# Patient Record
Sex: MALE | Race: WHITE | NOT HISPANIC OR LATINO | Employment: FULL TIME | URBAN - METROPOLITAN AREA
[De-identification: names, ages, dates, MRNs, and addresses within clinical notes are randomized per-mention and may not be internally consistent; named-entity substitution may affect disease eponyms.]

---
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
## 2020-06-25 ENCOUNTER — TELEMEDICINE (OUTPATIENT)
Dept: FAMILY MEDICINE CLINIC | Facility: TELEHEALTH | Age: 20
End: 2020-06-25

## 2020-06-25 DIAGNOSIS — U07.1 COVID-19 VIRUS DETECTED: Primary | ICD-10-CM

## 2020-06-25 DIAGNOSIS — Z20.822 CLOSE EXPOSURE TO COVID-19 VIRUS: ICD-10-CM

## 2020-06-25 PROCEDURE — 99213 OFFICE O/P EST LOW 20 MIN: CPT | Performed by: PHYSICIAN ASSISTANT

## 2020-06-25 NOTE — PATIENT INSTRUCTIONS
Encourage fluids.  Advise symptomatic treatment; antihistamines such as allegra or claritin, and decongestants such as sudafed.  May take tylenol if fever develops.  If symptoms worsen; severe shortness of breath develops, mental status changes develop, or if you are unable to perform your normal activities of daily living; go to the ER.  Quarantine x 10 days since date of COVID test. Wear a mask for 14 days after COVID test date.    How to Quarantine at Home  Information for Patients and Families    These instructions are for people with confirmed or suspected COVID-19 who do not need to be hospitalized and those with confirmed COVID-19 who were hospitalized and discharged to care for themselves at home.    If you were tested through the Health Department  The Health Department will monitor your wellbeing.  If it is determined that you do not need to be hospitalized and can be isolated at home, you will be monitored by staff from your local or state health department.     If you were tested through a Commercial Lab  You will need to monitor yourself and report changes in your symptoms to your doctor.  See the section below called Monitor Your Symptoms.    Follow these steps until a healthcare provider or local or state health department says you can return to your normal activities.    Stay home except to get medical care  • Restrict activities outside your home, except for getting medical care.   • Do not go to work, school, or public areas.   • Avoid using public transportation, ride-sharing, or taxis.    Separate yourself from other people and animals in your home  People  As much as possible, you should stay in a specific room and away from other people in your home. Also, you should use a separate bathroom, if available.    Animals  You should restrict contact with pets and other animals while you are sick with COVID-19, just like you would around other people. When possible, have another member of your  household care for your animals while you are sick. If you are sick with COVID-19, avoid contact with your pet, including petting, snuggling, being kissed or licked, and sharing food. If you must care for your pet or be around animals while you are sick, wash your hands before and after you interact with pets and wear a facemask. See COVID-19 and Animals for more information.    Call ahead before visiting your doctor  If you have a medical appointment, call the healthcare provider and tell them that you have or may have COVID-19. This information will help the healthcare provider’s office take steps to keep other people from getting infected or exposed.    Wear a facemask  You should wear a facemask when you are around other people (e.g., sharing a room or vehicle) or pets and before you enter a healthcare provider’s office.     If you are not able to wear a facemask (for example, because it causes trouble breathing), then people who live with you should not stay in the same room with you, or they should wear a facemask if they enter your room.    Cover your coughs and sneezes  • Cover your mouth and nose with a tissue when you cough or sneeze.   • Throw used tissues in a lined trash can.   • Immediately wash your hands with soap and water for at least 20 seconds or, if soap and water are not available, clean your hands with an alcohol-based hand  that contains at least 60% alcohol.    Clean your hands often  • Wash your hands often with soap and water for at least 20 seconds, especially after blowing your nose, coughing, or sneezing; going to the bathroom; and before eating or preparing food.     • If soap and water are not readily available, use an alcohol-based hand  with at least 60% alcohol, covering all surfaces of your hands and rubbing them together until they feel dry.    • Soap and water are the best option if hands are visibly dirty. Avoid touching your eyes, nose, and mouth with  unwashed hands.    Avoid sharing personal household items  • You should not share dishes, drinking glasses, cups, eating utensils, towels, or bedding with other people or pets in your home.   • After using these items, they should be washed thoroughly with soap and water.    Clean all “high-touch” surfaces everyday  • High touch surfaces include counters, tabletops, doorknobs, bathroom fixtures, toilets, phones, keyboards, tablets, and bedside tables.   • Also, clean any surfaces that may have blood, stool, or body fluids on them.   • Use a household cleaning spray or wipe, according to the label instructions. Labels contain instructions for safe and effective use of the cleaning product, including precautions you should take when applying the product, such as wearing gloves and making sure you have good ventilation during use of the product.    Monitor your symptoms  • Seek prompt medical attention if your illness is worsening (e.g., difficulty breathing).   • Before seeking care, call your healthcare provider and tell them that you have, or are being evaluated for, COVID-19.   • Put on a facemask before you enter the facility.     • These steps will help the healthcare provider’s office to keep other people in the office or waiting room from getting infected or exposed.   • Persons who are placed under active monitoring or facilitated self-monitoring should follow instructions provided by their local health department or occupational health professionals, as appropriate.  • If you have a medical emergency and need to call 911, notify the dispatch personnel that you have, or are being evaluated for COVID-19. If possible, put on a facemask before emergency medical services arrive.    Discontinuing home isolation  Patients with confirmed COVID-19 should remain under home isolation precautions until the risk of secondary transmission to others is thought to be low. The decision to discontinue home isolation  precautions should be made on a case-by-case basis, in consultation with healthcare providers and state and local health departments.    The below content are for household members, intimate partners, and caregivers of a patient with symptomatic laboratory-confirmed COVID-19 or a patient under investigation:    Household members, intimate partners, and caregivers may have close contact with a person with symptomatic, laboratory-confirmed COVID-19 or a person under investigation.     Close contacts should monitor their health; they should call their healthcare provider right away if they develop symptoms suggestive of COVID-19 (e.g., fever, cough, shortness of breath)     Close contacts should also follow these recommendations:  • Make sure that you understand and can help the patient follow their healthcare provider’s instructions for medication(s) and care. You should help the patient with basic needs in the home and provide support for getting groceries, prescriptions, and other personal needs.  • Monitor the patient’s symptoms. If the patient is getting sicker, call his or her healthcare provider and tell them that the patient has laboratory-confirmed COVID-19. This will help the healthcare provider’s office take steps to keep other people in the office or waiting room from getting infected. Ask the healthcare provider to call the local or state health department for additional guidance. If the patient has a medical emergency and you need to call 911, notify the dispatch personnel that the patient has, or is being evaluated for COVID-19.  • Household members should stay in another room or be  from the patient as much as possible. Household members should use a separate bedroom and bathroom, if available.  • Prohibit visitors who do not have an essential need to be in the home.  • Household members should care for any pets in the home. Do not handle pets or other animals while sick.  For more information,  see COVID-19 and Animals.  • Make sure that shared spaces in the home have good air flow, such as by an air conditioner or an opened window, weather permitting.  • Perform hand hygiene frequently. Wash your hands often with soap and water for at least 20 seconds or use an alcohol-based hand  that contains 60 to 95% alcohol, covering all surfaces of your hands and rubbing them together until they feel dry. Soap and water should be used preferentially if hands are visibly dirty.  • Avoid touching your eyes, nose, and mouth with unwashed hands.  • The patient should wear a facemask when you are around other people. If the patient is not able to wear a facemask (for example, because it causes trouble breathing), you, as the caregiver, should wear a mask when you are in the same room as the patient.  • Wear a disposable facemask and gloves when you touch or have contact with the patient’s blood, stool, or body fluids, such as saliva, sputum, nasal mucus, vomit, or urine.   o Throw out disposable facemasks and gloves after using them. Do not reuse.  o When removing personal protective equipment, first remove and dispose of gloves. Then, immediately clean your hands with soap and water or alcohol-based hand . Next, remove and dispose of facemask, and immediately clean your hands again with soap and water or alcohol-based hand .  • Avoid sharing household items with the patient. You should not share dishes, drinking glasses, cups, eating utensils, towels, bedding, or other items. After the patient uses these items, you should wash them thoroughly (see below “Wash laundry thoroughly”).  • Clean all “high-touch” surfaces, such as counters, tabletops, doorknobs, bathroom fixtures, toilets, phones, keyboards, tablets, and bedside tables, every day. Also, clean any surfaces that may have blood, stool, or body fluids on them.   o Use a household cleaning spray or wipe, according to the label  instructions. Labels contain instructions for safe and effective use of the cleaning product including precautions you should take when applying the product, such as wearing gloves and making sure you have good ventilation during use of the product.  • Wash laundry thoroughly.   o Immediately remove and wash clothes or bedding that have blood, stool, or body fluids on them.  o Wear disposable gloves while handling soiled items and keep soiled items away from your body. Clean your hands (with soap and water or an alcohol-based hand ) immediately after removing your gloves.  o Read and follow directions on labels of laundry or clothing items and detergent. In general, using a normal laundry detergent according to washing machine instructions and dry thoroughly using the warmest temperatures recommended on the clothing label.  • Place all used disposable gloves, facemasks, and other contaminated items in a lined container before disposing of them with other household waste. Clean your hands (with soap and water or an alcohol-based hand ) immediately after handling these items. Soap and water should be used preferentially if hands are visibly dirty.  • Discuss any additional questions with your state or local health department or healthcare provider.    Adapted from information provided by the Centers for Disease Control and Prevention.  For more information, visit https://www.cdc.gov/coronavirus/2019-ncov/hcp/guidance-prevent-spread.html

## 2020-06-25 NOTE — PROGRESS NOTES
Terence Dumont    2000  [id number]    I have reviewed the e-Visit questionnaire and patient's answers via E video visit.  My assessment and plan are as follows:    CC: Positive COVID test questions    HISTORY OF PRESENT ILLNESS  HPI   Pt is a 19 yr old m who presents via E visit with c/o positive COVID test results today and questions.  Pt states was in [place] last week and one of the people he traveled with tested positive for COVID.  Pt had test 2 days ago.  Pt c/o associated nasal congestion today and sore throat and cough last week that resolved.  Denies fever, shortness of breath, difficulty breathing, loss of taste/smell, nausea, vomiting, and diarrhea.  Pt states had positive COVID test at [facility].  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    Review of Systems   Constitutional: Negative for fever.   HENT: Positive for congestion and sore throat (last week; resolved). Negative for rhinorrhea.    Respiratory: Positive for cough (last week; resolved). Negative for shortness of breath and wheezing.           Physical Exam   Constitutional: He is oriented to person, place, and time. He appears well-developed and well-nourished.   HENT:   Head: Normocephalic and atraumatic.   Pulmonary/Chest: Effort normal.   Neurological: He is alert and oriented to person, place, and time.   Psychiatric: He has a normal mood and affect. His behavior is normal. Judgment and thought content normal.          Diagnoses and all orders for this visit:    COVID-19 virus detected    Close exposure to COVID-19 virus        Any medications prescribed have been sent electronically to   Encourage fluids.  Advise symptomatic treatment; antihistamines such as allegra or claritin, and decongestants such as sudafed.  May take tylenol if fever develops.  If symptoms worsen; severe shortness of breath develops, mental status changes develop, or if you are unable to perform your normal activities of daily living; go to the ER.   Quarantine x 10 days since date of COVID test. Wear a mask for 14 days after COVID test date.    Evideo visit 20 mins    Miriam Hodge PA-C  06/25/20  4:08 PM

## 2021-01-12 ENCOUNTER — LAB (OUTPATIENT)
Dept: LAB | Facility: HOSPITAL | Age: 21
End: 2021-01-12

## 2021-01-12 ENCOUNTER — TRANSCRIBE ORDERS (OUTPATIENT)
Dept: LAB | Facility: HOSPITAL | Age: 21
End: 2021-01-12

## 2021-01-12 DIAGNOSIS — Z79.899 ENCOUNTER FOR LONG-TERM (CURRENT) USE OF OTHER MEDICATIONS: ICD-10-CM

## 2021-01-12 DIAGNOSIS — Z79.899 ENCOUNTER FOR LONG-TERM (CURRENT) USE OF OTHER MEDICATIONS: Primary | ICD-10-CM

## 2021-01-12 LAB
ANION GAP SERPL CALCULATED.3IONS-SCNC: 7.6 MMOL/L (ref 5–15)
BUN SERPL-MCNC: 11 MG/DL (ref 6–20)
BUN/CREAT SERPL: 11.8 (ref 7–25)
CALCIUM SPEC-SCNC: 9.7 MG/DL (ref 8.6–10.5)
CHLORIDE SERPL-SCNC: 103 MMOL/L (ref 98–107)
CO2 SERPL-SCNC: 29.4 MMOL/L (ref 22–29)
CREAT SERPL-MCNC: 0.93 MG/DL (ref 0.76–1.27)
GFR SERPL CREATININE-BSD FRML MDRD: 104 ML/MIN/1.73
GLUCOSE SERPL-MCNC: 93 MG/DL (ref 65–99)
LITHIUM SERPL-SCNC: 0.9 MMOL/L (ref 0.6–1.2)
POTASSIUM SERPL-SCNC: 4.7 MMOL/L (ref 3.5–5.2)
SODIUM SERPL-SCNC: 140 MMOL/L (ref 136–145)
TSH SERPL DL<=0.05 MIU/L-ACNC: 8.04 UIU/ML (ref 0.27–4.2)

## 2021-01-12 PROCEDURE — 84443 ASSAY THYROID STIM HORMONE: CPT

## 2021-01-12 PROCEDURE — 36415 COLL VENOUS BLD VENIPUNCTURE: CPT

## 2021-01-12 PROCEDURE — 80178 ASSAY OF LITHIUM: CPT

## 2021-01-12 PROCEDURE — 80048 BASIC METABOLIC PNL TOTAL CA: CPT

## 2021-08-06 ENCOUNTER — OFFICE VISIT (OUTPATIENT)
Dept: SPORTS MEDICINE | Facility: CLINIC | Age: 21
End: 2021-08-06

## 2021-08-06 VITALS
BODY MASS INDEX: 22.4 KG/M2 | SYSTOLIC BLOOD PRESSURE: 120 MMHG | WEIGHT: 160 LBS | TEMPERATURE: 98 F | DIASTOLIC BLOOD PRESSURE: 60 MMHG | OXYGEN SATURATION: 98 % | HEART RATE: 74 BPM | HEIGHT: 71 IN | RESPIRATION RATE: 16 BRPM

## 2021-08-06 DIAGNOSIS — M84.362A STRESS FRACTURE, LEFT TIBIA, INITIAL ENCOUNTER FOR FRACTURE: ICD-10-CM

## 2021-08-06 DIAGNOSIS — M79.669 PAIN IN THE SHINS, UNSPECIFIED LATERALITY: Primary | ICD-10-CM

## 2021-08-06 DIAGNOSIS — S86.899A MEDIAL TIBIAL STRESS SYNDROME, UNSPECIFIED LATERALITY, INITIAL ENCOUNTER: ICD-10-CM

## 2021-08-06 PROCEDURE — 99204 OFFICE O/P NEW MOD 45 MIN: CPT | Performed by: FAMILY MEDICINE

## 2021-08-06 PROCEDURE — 73590 X-RAY EXAM OF LOWER LEG: CPT | Performed by: FAMILY MEDICINE

## 2021-08-06 RX ORDER — LITHIUM CARBONATE 450 MG
450 TABLET, EXTENDED RELEASE ORAL 2 TIMES DAILY
COMMUNITY
Start: 2021-07-14 | End: 2022-07-29

## 2021-08-06 RX ORDER — LAMOTRIGINE 25 MG/1
50 TABLET ORAL DAILY
COMMUNITY
Start: 2021-07-14 | End: 2022-07-29

## 2021-08-06 NOTE — PROGRESS NOTES
"Terence is a 21 y.o. year old male presents to [facility]    Chief Complaint   Patient presents with   • Leg Pain     evaluation for B/L leg/shin pain - NKI, patient is a runner, worsened about 3 weeks ago - here today with new  x-rays for further evaluation and treatment        History of Present Illness  Recently, over the past 2 years approximately he began running for enjoyment.  He has been trying to run recently but he has been limited due to bilateral lower leg pain.  He states that the most he will run is 3 miles in one setting.  He attempted running early June but had to get different footwear due to his pain.  He states that he has taken time off though his pain still persists even at rest in the left lower extremity.  Has tried ice which has been helpful.  Returning to college next week.  [facility].    I have reviewed the patient's medical, family, and social history in detail and updated the computerized patient record.    /60 (BP Location: Left arm, Patient Position: Sitting, Cuff Size: Adult)   Pulse 74   Temp 98 °F (36.7 °C) (Temporal)   Resp 16   Ht 180.3 cm (71\")   Wt 72.6 kg (160 lb)   SpO2 98%   BMI 22.32 kg/m²      Physical Exam    Mask worn thru encounter  Vital signs reviewed.   General: No acute distress.  Eyes: conjunctiva clear; pupils equally round and reactive  ENT: external ears atraumatic  CV: no peripheral edema  Resp: normal respiratory effort, no use of accessory muscles  Skin: no rashes or wounds; normal turgor  Psych: mood and affect appropriate; recent and remote memory intact  Neuro: sensation to light touch intact    MSK Exam  Bilateral lower extremity: No gross abnormality.  There is focal area of tenderness along the distal third of the medial tibia that is worsened with single leg hop.     Bilateral Tib-Fib X-Ray  Indication: Pain  AP and Lateral views    Findings:  No fracture  No bony lesion  Normal soft tissues  Normal " joint spaces    No prior studies were available for comparison.           Diagnoses and all orders for this visit:    Pain in the shins, unspecified laterality  -     XR Tibia Fibula 2 View Bilateral    Medial tibial stress syndrome, unspecified laterality, initial encounter  -     Ambulatory Referral to Physical Therapy    Stress fracture, left tibia, initial encounter for fracture  -     MRI Tibia Fibula Left Without Contrast; Future    Other orders  -     lithium (ESKALITH) 450 MG CR tablet; Take 450 mg by mouth 2 (Two) Times a Day.  -     lamoTRIgine (LaMICtal) 25 MG tablet; Take 50 mg by mouth Daily.    I do think gait analysis, PT will be of help though given his focal bone pain despite adequate interval of rest, I am concerned for stress fracture of the left tibia.  I recommend MRI on this leg prior to pursuing physical therapy.      Follow Up   No follow-ups on file.  Patient was given instructions and counseling regarding his condition or for health maintenance advice. Please see specific information pulled into the AVS if appropriate.     EMR Dragon/Transcription disclaimer:    Much of this encounter note is an electronic transcription/translation of spoken language to printed text.  The electronic translation of spoken language may permit erroneous, or at times, nonsensical words or phrases to be inadvertently transcribed.  Although I have reviewed the note for such errors some may still exist.

## 2021-08-07 ENCOUNTER — HOSPITAL ENCOUNTER (OUTPATIENT)
Dept: MRI IMAGING | Facility: HOSPITAL | Age: 21
Discharge: HOME | End: 2021-08-07
Admitting: FAMILY MEDICINE

## 2021-08-07 DIAGNOSIS — M84.362A STRESS FRACTURE, LEFT TIBIA, INITIAL ENCOUNTER FOR FRACTURE: ICD-10-CM

## 2021-08-07 PROCEDURE — 73718 MRI LOWER EXTREMITY W/O DYE: CPT

## 2022-07-29 PROBLEM — Z20.822 ENCOUNTER FOR LABORATORY TESTING FOR COVID-19 VIRUS: Status: ACTIVE | Noted: 2022-07-29

## 2022-07-29 PROCEDURE — U0004 COV-19 TEST NON-CDC HGH THRU: HCPCS | Performed by: NURSE PRACTITIONER

## 2024-05-21 ENCOUNTER — TRANSCRIBE ORDERS (OUTPATIENT)
Dept: PSYCHIATRY | Facility: CLINIC | Age: 24
End: 2024-05-21
Payer: COMMERCIAL

## 2024-05-21 DIAGNOSIS — F32.A DEPRESSION, UNSPECIFIED DEPRESSION TYPE: Primary | ICD-10-CM

## 2024-05-21 DIAGNOSIS — F31.9 BIPOLAR AFFECTIVE DISORDER, REMISSION STATUS UNSPECIFIED: ICD-10-CM

## 2024-05-24 ENCOUNTER — OFFICE VISIT (OUTPATIENT)
Dept: PSYCHIATRY | Facility: CLINIC | Age: 24
End: 2024-05-24
Payer: COMMERCIAL

## 2024-05-24 DIAGNOSIS — F41.1 GENERALIZED ANXIETY DISORDER: ICD-10-CM

## 2024-05-24 DIAGNOSIS — F33.1 MAJOR DEPRESSIVE DISORDER, RECURRENT EPISODE, MODERATE: Primary | ICD-10-CM

## 2024-05-24 RX ORDER — BUPROPION HYDROCHLORIDE 100 MG/1
100 TABLET, EXTENDED RELEASE ORAL DAILY
Qty: 7 TABLET | Refills: 0 | Status: SHIPPED | OUTPATIENT
Start: 2024-05-24

## 2024-05-24 RX ORDER — BUPROPION HYDROCHLORIDE 150 MG/1
150 TABLET ORAL EVERY MORNING
Qty: 30 TABLET | Refills: 1 | Status: SHIPPED | OUTPATIENT
Start: 2024-05-24 | End: 2025-05-24

## 2024-05-24 NOTE — PROGRESS NOTES
"  Chief complaint: medication management       Subjective      History of present illness:  Dr. Larsen referred pt for medication management for bipolar do and anxiety.  Started medication in December 2023 Vraylar, gabapentin, and effexor   Previously seen with [facility] for medication management   Been on and off meds since 2019   Hx of non-compliance , often stops taking because he forgets medication   Started college in 2019 Depression , and anxiety began at that time related to difficulty of work and classes.   2021 SA by OD on Advil, started on Lithium while hospitalized   Stopped bc lab testing was difficult     Denies SI today   Hx of Passive SI and one previous attempt   When feeling overwhelmed, begins to isolate, thinks about ways to end life, such as OD, but denies intent.   No access to guns or weapons   Lives alone, Parents and girlfriend aware of pts struggles with mental health  Safe outlet for pt, can request help if needed   Has not had thoughts or SI recently       Discussed bipolar diagnosis   \"Manic symptoms\" associated with increased energy, \"very hyper\"  Depression associated with \"low low days\" decreased energy, decreased motivation, isolating     \"Manic episodes\" last approximately \"few days\" , occurring approximately 1x per month   Depressive episode follows, usually depressed more    20 years of age completed neuro psych testing : was not evaluated for ADHD   Pt had a hard time completing testing due to inattention, restlessness  Evaluator did not feel ADHD testing was appropriate bc \"I had made it this far into my life and done ok with school\"      Tantrums as a child, often left school due to behavior  Easily overwhelmed     Currently in school, has difficulty in school currently   Non-preferred tasks are difficult   Trouble doing homework through out his life, grades were always \"ok\" but often procrastinates to the last minute until he has to complete work   Causing " "frustration and anxiety, feeling inadequate   \"Then I get really depressed and anxious\"   Trouble staying on tasks , wants to read but can't stay focused   Comprehension decreased , often rereads, wishes he could read more   Head phones help, white noise helps   Completing work at another location then home helps      \"The bipolar diagnosis has never resonated with me, I feel like they just decided that when I was in the hospital\"       Mood: \"Its pretty alright\"     Sleep: \"Pretty bad, I tend to sleep in\" Goes to bed 2300-00 : usually wakes early at 0600 but goes back to sleep until 7955-6469.     Energy: \"Pretty low, I think oversleeping has to do with that\"     Concentration/Focus: \"Its not as great as it could be\" Difficult to concentrate on tasks at work, often gets distracted.     Appetite: Gained 15 pounds this last year without meaning to , poor diet eats fast food often     SI/HI/AVH: Denies     Denies current self injurious behavior  Denies current symptoms of psychosis, veronica, hypomania  Denies current symptoms of panic  Denies current SI/HI/AVH   Denies any current unwanted or adverse medication side effects     Medical History     PCP: Dr. Larsen   [facility] Denies     Denies history of or current seizures, denies history of head injury  Denies cardiac history, or abnormal ekgs, or irregular heart rate/rhythm    Psychiatric History    Previous Diagnoses: Bipolar, depression, anxiety   Previous Psychotropic medications: Lithium, Vraylar, Effexor, gabapentin, Lexapro, Hydroxyzine   Psychotherapy: Previous, helpful unable to find new therapist with insurance   Hospitalization hx: Previous 2020   Previous SI/HI/AVH: Denies , Previous SI/SA     Family Psychiatric History: Unknown       Social History     Socioeconomic History    Marital status: Single   Tobacco Use    Smoking status: Never     Passive exposure: Never    Smokeless tobacco: Never   Vaping Use    Vaping status: Never Used   Substance and Sexual " Activity    Alcohol use: Yes     Alcohol/week: 3.0 standard drinks of alcohol     Types: 3 Standard drinks or equivalent per week    Drug use: Yes     Types: Marijuana     Comment: OCCAS.    Sexual activity: Yes     Partners: Female     Comment: 3 in last year       Relationships: girlfriend   Education: College   Developmenal HX (504, IEP, milestones, complications at birth): Denies   Occupation: full time   Living Arrangements: alone   Trauma (emotional, psychological, physical, sexual) : No hx of trauma   Legal: Denies   Hobbies: watching soccer, Legos, reading (hard to do) , hiking     Substance use:   Alcohol: 2 beers per week   Illicit drugs: Denies  Cannabis/Marijuana: Delta 8 : once per month   Caffeine: 3 12 oz cans of soda every day   Prescription medications: Denies   Tobacco: Denies   Vaping: Delta 8 : once per month , nicotine (daily)  OTC: Denies         Current Outpatient Medications:       Current Outpatient Medications:     buPROPion SR (Wellbutrin SR) 100 MG 12 hr tablet, Take 1 tablet by mouth Daily., Disp: 7 tablet, Rfl: 0    buPROPion XL (Wellbutrin XL) 150 MG 24 hr tablet, Take 1 tablet by mouth Every Morning., Disp: 30 tablet, Rfl: 1     Allergies:  Allergies   Allergen Reactions    Amoxicillin Hives    Beef-Derived Products Anaphylaxis     States he is able to eat these regularly now.     Penicillins Hives and Rash    Pork-Derived Products Anaphylaxis     States he is able to eat these regularly now.         PHQ9    PHQ-9 Depression Screening  Little interest or pleasure in doing things? 1-->several days   Feeling down, depressed, or hopeless? 2-->more than half the days   Trouble falling or staying asleep, or sleeping too much? 3-->nearly every day   Feeling tired or having little energy? 3-->nearly every day   Poor appetite or overeating? 0-->not at all   Feeling bad about yourself - or that you are a failure or have let yourself or your family down? 2-->more than half the days   Trouble  concentrating on things, such as reading the newspaper or watching television? 1-->several days   Moving or speaking so slowly that other people could have noticed? Or the opposite - being so fidgety or restless that you have been moving around a lot more than usual? 0-->not at all   Thoughts that you would be better off dead, or of hurting yourself in some way? 1-->several days   PHQ-9 Total Score 13   If you checked off any problems, how difficult have these problems made it for you to do your work, take care of things at home, or get along with other people? very difficult      JONATHAN-7:   Over the last two weeks, how often have you been bothered by the following problems?  Feeling nervous, anxious or on edge: Nearly every day  Not being able to stop or control worrying: Several days  Worrying too much about different things: Nearly every day  Trouble Relaxing: Several days  Being so restless that it is hard to sit still: Not at all  Becoming easily annoyed or irritable: Nearly every day  Feeling afraid as if something awful might happen: Several days  JONATHAN 7 Total Score: 12  If you checked any problems, how difficult have these problems made it for you to do your work, take care of things at home, or get along with other people: Very difficult]    Objective:     Vital Signs   There were no vitals filed for this visit.       MENTAL STATUS EXAM   General Appearance:  Cleanly groomed and dressed  Eye Contact:  Good eye contact  Attitude:  Cooperative and polite  Motor Activity:  Fidgety  Muscle Strength:  Normal  Speech:  Normal rate, tone, volume  Language:  Spontaneous  Mood and affect:  Normal, pleasant  Hopelessness:  Denies  Thought Process:  Logical, goal-directed and linear  Associations/ Thought Content:  No delusions  Hallucinations:  None  Suicidal Ideations:  Not present  Homicidal Ideation:  Not present  Sensorium:  Alert and clear  Orientation:  Person, place, situation and time  Attention Span/  Concentration:  Other  Other Comment:  Fair  Fund of Knowledge:  Appropriate for age and educational level  Intellectual Functioning:  Average range  Insight:  Good  Judgement:  Good  Reliability:  Good  Impulse Control:  Good            Assessment & Plan   Diagnoses and all orders for this visit:    Diagnoses and all orders for this visit:    1. Major depressive disorder, recurrent episode, moderate (Primary)  -     buPROPion SR (Wellbutrin SR) 100 MG 12 hr tablet; Take 1 tablet by mouth Daily.  Dispense: 7 tablet; Refill: 0  -     buPROPion XL (Wellbutrin XL) 150 MG 24 hr tablet; Take 1 tablet by mouth Every Morning.  Dispense: 30 tablet; Refill: 1    2. Generalized anxiety disorder  -     buPROPion SR (Wellbutrin SR) 100 MG 12 hr tablet; Take 1 tablet by mouth Daily.  Dispense: 7 tablet; Refill: 0  -     buPROPion XL (Wellbutrin XL) 150 MG 24 hr tablet; Take 1 tablet by mouth Every Morning.  Dispense: 30 tablet; Refill: 1       Treatment Plan:  Continue supportive psychotherapy efforts and medications as indicated. Treatment and medication options discussed during today's visit. Patient ackowledged and verbally consented to continue with current treatment plan and was educated on the importance of compliance with treatment and follow-up appointments.     Medication side effects reviewed and discussed.  Patient agrees to call office with worsening symptoms or adverse medication side effects.   Continue supportive psychotherapy efforts and medications as indicated. Treatment and medication options discussed during today's visit. Patient ackowledged and verbally consented to continue with current treatment plan and was educated on the importance of compliance with treatment and follow-up appointments.     Pt does not appear to meet full criteria for Bipolar disorder, but will consider as deferential diagnosis.   Manic symptoms included increased energy, hyperactivity.  Denied elevated mood, sleep disruption, or  behavioral changes   Does meet criteria for depression and anxiety   Moderate to severe depression   WURS   ASRS  PHQ9 13   GAD7  12  Evaluated for ADHD, pt appears to have several symptoms consistent with ADHD diagnosis  Wellbutrin can be safer option for bipolar and also helpful for ADHD, depression, and anxiety     Start Wellbutrin 100 daily for 7 days   Increase to Wellbutrin  mg daily for depression, anxiety     Follow up 6-8 weeks     Short Term Goals: Improve depression. Decrease anxiety     Long Term Goals: Remission of symptoms     Treatment Plan discussed with: Patient, I discussed the patients findings and my recommendations with patient. Pt is agreeable and approves of plan.    Pt agrees with a safety plan for any thoughts of self injurious behavior. Pt will call this office at [phone] or the suicide hotline at 192.  In an emergency the pt agrees to call 911.    Referring MD has access to consult report and progress notes in EMR     Babita Corona DNP, APRN   05/24/2024   09:07 EDT

## 2024-07-02 ENCOUNTER — OFFICE VISIT (OUTPATIENT)
Dept: PSYCHIATRY | Facility: CLINIC | Age: 24
End: 2024-07-02
Payer: COMMERCIAL

## 2024-07-02 DIAGNOSIS — F90.0 ADHD (ATTENTION DEFICIT HYPERACTIVITY DISORDER), INATTENTIVE TYPE: ICD-10-CM

## 2024-07-02 DIAGNOSIS — F41.1 GENERALIZED ANXIETY DISORDER: ICD-10-CM

## 2024-07-02 DIAGNOSIS — F33.1 MAJOR DEPRESSIVE DISORDER, RECURRENT EPISODE, MODERATE: Primary | ICD-10-CM

## 2024-07-02 PROCEDURE — 99214 OFFICE O/P EST MOD 30 MIN: CPT

## 2024-07-02 NOTE — PROGRESS NOTES
"  Chief complaint: medication management       Subjective      History of present illness:  Dr. Larsen referred pt for medication management for bipolar do and anxiety.  Started medication in December 2023 Vraylar, gabapentin, and effexor   Previously seen with [facility] for medication management   Been on and off meds since 2019   Hx of non-compliance , often stops taking because he forgets medication   Started college in 2019 Depression , and anxiety began at that time related to difficulty of work and classes.   2021 SA by OD on Advil, started on Lithium while hospitalized   Stopped bc lab testing was difficult     Denies SI today   Hx of Passive SI and one previous attempt   When feeling overwhelmed, begins to isolate, thinks about ways to end life, such as OD, but denies intent.   No access to guns or weapons   Lives alone, Parents and girlfriend aware of pts struggles with mental health  Safe outlet for pt, can request help if needed   Has not had thoughts or SI recently       Discussed bipolar diagnosis   \"Manic symptoms\" associated with increased energy, \"very hyper\"  Depression associated with \"low low days\" decreased energy, decreased motivation, isolating     \"Manic episodes\" last approximately \"few days\" , occurring approximately 1x per month   Depressive episode follows, usually depressed more    20 years of age completed neuro psych testing : was not evaluated for ADHD   Pt had a hard time completing testing due to inattention, restlessness  Evaluator did not feel ADHD testing was appropriate bc \"I had made it this far into my life and done ok with school\"      Tantrums as a child, often left school due to behavior  Easily overwhelmed     Currently in school, has difficulty in school currently   Non-preferred tasks are difficult   Trouble doing homework through out his life, grades were always \"ok\" but often procrastinates to the last minute until he has to complete work   Causing " "frustration and anxiety, feeling inadequate   \"Then I get really depressed and anxious\"   Trouble staying on tasks , wants to read but can't stay focused   Comprehension decreased , often rereads, wishes he could read more   Head phones help, white noise helps   Completing work at another location then home helps      \"The bipolar diagnosis has never resonated with me, I feel like they just decided that when I was in the hospital\"       Today   Hasn't started Wellbutrin  mg , took all of 100 mg   Apartment has pest control issues   Energy increased slightly with Wellbutrin   Sleep improved, waking on a time, not sleeping late   Decrease in anxiety , less irritability   Mood improved despite stressors     Mood: \"Good\"     Sleep: Waking up on time, not sleeping late      Energy: \"A little better\"    Concentration/Focus: Difficult to concentrate on tasks at work, often gets distracted.     Appetite: Gained 15 pounds this last year without meaning to , poor diet eats fast food often     SI/HI/AVH: Denies     Denies current self injurious behavior  Denies current symptoms of psychosis, veronica, hypomania  Denies current symptoms of panic  Denies current SI/HI/AVH   Denies any current unwanted or adverse medication side effects     Medical History     PCP: Dr. Larsen   [facility] Denies     Denies history of or current seizures, denies history of head injury  Denies cardiac history, or abnormal ekgs, or irregular heart rate/rhythm    Psychiatric History    Previous Diagnoses: Bipolar, depression, anxiety   Previous Psychotropic medications: Lithium, Vraylar, Effexor, gabapentin, Lexapro, Hydroxyzine   Psychotherapy: Previous, helpful unable to find new therapist with insurance   Hospitalization hx: Previous 2020   Previous SI/HI/AVH: Denies , Previous SI/SA     Family Psychiatric History: Unknown       Social History     Socioeconomic History    Marital status: Single   Tobacco Use    Smoking status: Never     Passive " exposure: Never    Smokeless tobacco: Never   Vaping Use    Vaping status: Never Used   Substance and Sexual Activity    Alcohol use: Yes     Alcohol/week: 3.0 standard drinks of alcohol     Types: 3 Standard drinks or equivalent per week    Drug use: Yes     Types: Marijuana     Comment: OCCAS.    Sexual activity: Yes     Partners: Female     Comment: 3 in last year       Relationships: girlfriend   Education: College   Developmenal HX (504, IEP, milestones, complications at birth): Denies   Occupation: full time   Living Arrangements: alone   Trauma (emotional, psychological, physical, sexual) : No hx of trauma   Legal: Denies   Hobbies: watching soccer, Legos, reading (hard to do) , hiking     Substance use:   Alcohol: 2 beers per week   Illicit drugs: Denies  Cannabis/Marijuana: Delta 8 : once per month   Caffeine: 3 12 oz cans of soda every day   Prescription medications: Denies   Tobacco: Denies   Vaping: Delta 8 : once per month , nicotine (daily)  OTC: Denies         Current Outpatient Medications:       Current Outpatient Medications:     buPROPion SR (Wellbutrin SR) 100 MG 12 hr tablet, Take 1 tablet by mouth Daily., Disp: 7 tablet, Rfl: 0    buPROPion XL (Wellbutrin XL) 150 MG 24 hr tablet, Take 1 tablet by mouth Every Morning., Disp: 30 tablet, Rfl: 1     Allergies:  Allergies   Allergen Reactions    Amoxicillin Hives    Beef-Derived Products Anaphylaxis     States he is able to eat these regularly now.     Penicillins Hives and Rash    Pork-Derived Products Anaphylaxis     States he is able to eat these regularly now.         PHQ9    PHQ-9 Depression Screening  Little interest or pleasure in doing things? 1-->several days   Feeling down, depressed, or hopeless? 1-->several days   Trouble falling or staying asleep, or sleeping too much? 2-->more than half the days   Feeling tired or having little energy? 1-->several days   Poor appetite or overeating? 0-->not at all   Feeling bad about yourself - or that  you are a failure or have let yourself or your family down? 1-->several days   Trouble concentrating on things, such as reading the newspaper or watching television? 2-->more than half the days   Moving or speaking so slowly that other people could have noticed? Or the opposite - being so fidgety or restless that you have been moving around a lot more than usual? 0-->not at all   Thoughts that you would be better off dead, or of hurting yourself in some way? 0-->not at all   PHQ-9 Total Score 8   If you checked off any problems, how difficult have these problems made it for you to do your work, take care of things at home, or get along with other people? somewhat difficult      JONATHAN-7:   Over the last two weeks, how often have you been bothered by the following problems?  Feeling nervous, anxious or on edge: More than half the days  Not being able to stop or control worrying: More than half the days  Worrying too much about different things: More than half the days  Trouble Relaxing: Several days  Being so restless that it is hard to sit still: Several days  Becoming easily annoyed or irritable: More than half the days  Feeling afraid as if something awful might happen: Not at all  JONATHAN 7 Total Score: 10  If you checked any problems, how difficult have these problems made it for you to do your work, take care of things at home, or get along with other people: Somewhat difficult]    Objective:     Vital Signs   There were no vitals filed for this visit.       MENTAL STATUS EXAM   General Appearance:  Cleanly groomed and dressed  Eye Contact:  Good eye contact  Attitude:  Cooperative and polite  Motor Activity:  Fidgety  Muscle Strength:  Normal  Speech:  Normal rate, tone, volume  Language:  Spontaneous  Mood and affect:  Normal, pleasant  Hopelessness:  Denies  Thought Process:  Logical, goal-directed and linear  Associations/ Thought Content:  No delusions  Hallucinations:  None  Suicidal Ideations:  Not  present  Homicidal Ideation:  Not present  Sensorium:  Alert and clear  Orientation:  Person, place, situation and time  Attention Span/ Concentration:  Other  Other Comment:  Fair  Fund of Knowledge:  Appropriate for age and educational level  Intellectual Functioning:  Average range  Insight:  Good  Judgement:  Good  Reliability:  Good  Impulse Control:  Good            Assessment & Plan   Diagnoses and all orders for this visit:    Diagnoses and all orders for this visit:    1. Major depressive disorder, recurrent episode, moderate (Primary)    2. Generalized anxiety disorder    3. ADHD (attention deficit hyperactivity disorder), inattentive type         Treatment Plan:  Continue supportive psychotherapy efforts and medications as indicated. Treatment and medication options discussed during today's visit. Patient ackowledged and verbally consented to continue with current treatment plan and was educated on the importance of compliance with treatment and follow-up appointments.     Medication side effects reviewed and discussed.  Patient agrees to call office with worsening symptoms or adverse medication side effects.   Continue supportive psychotherapy efforts and medications as indicated. Treatment and medication options discussed during today's visit. Patient ackowledged and verbally consented to continue with current treatment plan and was educated on the importance of compliance with treatment and follow-up appointments.     Pt does not appear to meet full criteria for Bipolar disorder, but will consider as deferential diagnosis.   Manic symptoms included increased energy, hyperactivity.  Denied elevated mood, sleep disruption, or behavioral changes     WURS completed at home, brought with pt today : Positive 66: cutoff 45   Start Wellbutrin XL today , pt had rx at home   Depression and anxiety associated with ADHD symptoms , Wellbutrin was started last appt   Pt had issues with apartment where he stays,  unable to start Wellbutrin  mg but finished  mg with some benefit , improvement in symptoms Sleep, energy     WURS 66  ASRS positive  PHQ9 13   GAD7  12    Start Wellbutrin  mg daily for ADHD, depression      Follow up 8 weeks     Short Term Goals: Improve depression. Decrease anxiety     Long Term Goals: Remission of symptoms     Treatment Plan discussed with: Patient, I discussed the patients findings and my recommendations with patient. Pt is agreeable and approves of plan.    Pt agrees with a safety plan for any thoughts of self injurious behavior. Pt will call this office at [phone] or the suicide hotline at 441.  In an emergency the pt agrees to call 911.    Referring MD has access to consult report and progress notes in EMR     Babita Corona DNP, APRN   07/02/2024   09:07 EDT

## 2024-08-05 NOTE — PROGRESS NOTES
"Chief Complaint  Sleep Walking    Subjective          Terence Dumont presents to [facility] NEUROLOGY  History of Present Illness    New patient referred by Dr. Garcia for sleep walking and sleep talking.  In May he stated he woke up standing in the driveway and decided it was time to be seen.   Parents reports he has had sleep walking and sleep talking all his life, had night terrors as a child   Pt woke recently in driveway with dream he was getting on a bus  Usually wondering around  Sleep talks, as well  Wakes several times per night, typically has poor sleep then has a night of deep sleep.   If sleeps deep has sleep walking or talking  The patient c/o daytime sleepiness issues:  excessive daytime sleepiness, .    There is no history of hypnagogic hallucinations, sleep paralysis or cataplexy.  Has been told that he snores   No restless legs.     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    Taking Wellbutrin for past 3 months, easier to get up in morning.     EPWORTH SLEEPINESS SCALE  Sitting and reading [place] Sleepiness: 3  WatchingTV [place] Sleepiness: 1  Sitting, inactive, in a public place [place] Sleepiness: 2 As a passenger in a car for 1 hour w/o a break  [place] Sleepiness: 1  Lying down to rest in the afternoon  [place] Sleepiness: 3   Sitting and talking to someone  [place] Sleepiness: 1  Sitting quietly after a lunch  [place] Sleepiness: 0  In a car, while stopped for traffic or a light  [place] Sleepiness: 0  Total 11    Review of Systems   Psychiatric/Behavioral:  Positive for decreased concentration. The patient is nervous/anxious.    All other systems reviewed and are negative.     Objective   Vital Signs:   /77   Pulse 82   Ht 182.9 cm (72\")   Wt 82.1 kg (181 lb)   BMI 24.55 kg/m²     Physical Exam  Vitals reviewed.   Constitutional:       " Appearance: Normal appearance.   HENT:      Head: Normocephalic.   Cardiovascular:      Rate and Rhythm: Normal rate and regular rhythm.   Pulmonary:      Effort: Pulmonary effort is normal. No respiratory distress.   Neurological:      General: No focal deficit present.      Mental Status: He is alert and oriented to person, place, and time.   Psychiatric:         Mood and Affect: Mood normal.        Result Review :                 Assessment and Plan    Diagnoses and all orders for this visit:    1. Parasomnia, unspecified type (Primary)    2. Sleep walking    3. Sleep talking    Frequent sleep parasomnia, by history suggest N-3 sleep related disorders  Will obtain in lab test for diagnostic purpose and to rule out other sleep disorder.      Follow Up   No follow-ups on file.  Patient was given instructions and counseling regarding his condition or for health maintenance advice. Please see specific information pulled into the AVS if appropriate.       This document has been electronically signed by Joseph Seipel, MD on August 6, 2024 17:25 EDT

## 2024-08-06 ENCOUNTER — OFFICE VISIT (OUTPATIENT)
Dept: NEUROLOGY | Facility: CLINIC | Age: 24
End: 2024-08-06
Payer: COMMERCIAL

## 2024-08-06 VITALS
WEIGHT: 181 LBS | BODY MASS INDEX: 24.52 KG/M2 | HEIGHT: 72 IN | SYSTOLIC BLOOD PRESSURE: 127 MMHG | DIASTOLIC BLOOD PRESSURE: 77 MMHG | HEART RATE: 82 BPM

## 2024-08-06 DIAGNOSIS — F51.3 SLEEP WALKING: ICD-10-CM

## 2024-08-06 DIAGNOSIS — G47.50 PARASOMNIA, UNSPECIFIED TYPE: Primary | ICD-10-CM

## 2024-08-06 DIAGNOSIS — G47.8 SLEEP TALKING: ICD-10-CM

## 2024-08-06 PROCEDURE — 99214 OFFICE O/P EST MOD 30 MIN: CPT | Performed by: PSYCHIATRY & NEUROLOGY

## 2024-11-05 DIAGNOSIS — G47.33 OSA (OBSTRUCTIVE SLEEP APNEA): ICD-10-CM

## 2024-11-05 DIAGNOSIS — G47.50 PARASOMNIA, UNSPECIFIED TYPE: Primary | ICD-10-CM
